# Patient Record
Sex: MALE | HISPANIC OR LATINO | URBAN - METROPOLITAN AREA
[De-identification: names, ages, dates, MRNs, and addresses within clinical notes are randomized per-mention and may not be internally consistent; named-entity substitution may affect disease eponyms.]

---

## 2023-03-24 ENCOUNTER — OFFICE VISIT (OUTPATIENT)
Dept: URGENT CARE | Facility: CLINIC | Age: 28
End: 2023-03-24

## 2023-03-24 ENCOUNTER — APPOINTMENT (OUTPATIENT)
Dept: RADIOLOGY | Facility: CLINIC | Age: 28
End: 2023-03-24

## 2023-03-24 VITALS
DIASTOLIC BLOOD PRESSURE: 59 MMHG | TEMPERATURE: 97.6 F | BODY MASS INDEX: 32.39 KG/M2 | WEIGHT: 165 LBS | SYSTOLIC BLOOD PRESSURE: 113 MMHG | RESPIRATION RATE: 18 BRPM | HEART RATE: 94 BPM | OXYGEN SATURATION: 96 % | HEIGHT: 60 IN

## 2023-03-24 DIAGNOSIS — S93.402A SPRAIN OF LEFT ANKLE, UNSPECIFIED LIGAMENT, INITIAL ENCOUNTER: Primary | ICD-10-CM

## 2023-03-24 DIAGNOSIS — M79.672 LEFT FOOT PAIN: ICD-10-CM

## 2023-03-24 RX ORDER — NORETHINDRONE ACETATE AND ETHINYL ESTRADIOL 5-7-9-7
1 KIT ORAL DAILY
COMMUNITY
Start: 2023-02-13

## 2023-03-24 NOTE — PATIENT INSTRUCTIONS
Aleve twice per day for inflammation and pain  Wear brace for support (Remove braces every 3 hours)  Ice 20 minutes 3-4 times per day for 3 days  Insulate the skin from the ice to prevent frostbite  Rest and Elevate  Follow up with orthopedic if symptoms do not improve    Remove splint/brace and go straight to ER if you experience sudden increase in pain, swelling, change in color/temperature/sensation, chest pain, shortness or breath, or if you start coughing up blood  Follow up with PCP in 3-5 days

## 2023-03-24 NOTE — PROGRESS NOTES
[facility]        NAME: Sohan Allen is a 24 y o  male  : 1995    MRN: [id number]  DATE: 2023  TIME: 8:57 AM    Assessment and Plan   Sprain of left ankle, unspecified ligament, initial encounter [S93 [id number]]  1  Sprain of left ankle, unspecified ligament, initial encounter  Ambulatory Referral to Orthopedic Surgery      2  Left foot pain  XR foot 3+ vw left        Preliminary xray read by myself  No acute osseous abnormality noted  Pending radiologist final read  Ace wrap and ortho referral provided if symptoms are not improving  Patient Instructions     Aleve twice per day for inflammation and pain  Wear brace for support (Remove braces every 3 hours)  Ice 20 minutes 3-4 times per day for 3 days  Insulate the skin from the ice to prevent frostbite  Rest and Elevate  Follow up with orthopedic if symptoms do not improve    Remove splint/brace and go straight to ER if you experience sudden increase in pain, swelling, change in color/temperature/sensation, chest pain, shortness or breath, or if you start coughing up blood  Follow up with PCP in 3-5 days  Chief Complaint     Chief Complaint   Patient presents with   • Leg Pain     Pt fell at 2AM yesterday morning  Pt states she was getting out of bed and her leg was asleep and she fell and heard a crack in her left foot, pt rates it 4/10 on pain scale and states walking makes it worse         History of Present Illness       Leg Pain   Incident onset: 2AM  The incident occurred at home  The injury mechanism was a fall and an eversion injury (heard a crack)  The pain is present in the left foot (lateral malleolus)  The pain is at a severity of 4/10  Pertinent negatives include no inability to bear weight, loss of motion, loss of sensation, muscle weakness, numbness or tingling  He has tried ice for the symptoms  Review of Systems   Review of Systems   Constitutional: Negative  HENT: Negative  Respiratory: Negative  Cardiovascular: Negative  Gastrointestinal: Negative  Genitourinary: Negative  Musculoskeletal: Negative for back pain, gait problem, joint swelling, neck pain and neck stiffness  Left ankle pain   Skin: Negative  Neurological: Negative  Negative for tingling and numbness  Current Medications       Current Outpatient Medications:   •  Tilia Fe 1-20/1-30/1-35 MG-MCG TABS, Take 1 tablet by mouth daily, Disp: , Rfl:     Current Allergies     Allergies as of 03/24/2023 - Reviewed 03/24/2023   Allergen Reaction Noted   • Prednisone Hives 02/24/2018            The following portions of the patient's history were reviewed and updated as appropriate: allergies, current medications, past family history, past medical history, past social history, past surgical history and problem list      History reviewed  No pertinent past medical history  Past Surgical History:   Procedure Laterality Date   • WISDOM TOOTH EXTRACTION Bilateral        Family History   Problem Relation Age of Onset   • Asthma Mother    • Asthma Father          Medications have been verified  Objective   /59   Pulse 94   Temp 97 6 °F (36 4 °C)   Resp 18   Ht 5' (1 524 m)   Wt 74 8 kg (165 lb)   SpO2 96%   BMI 32 22 kg/m²        Physical Exam     Physical Exam  Constitutional:       Appearance: Normal appearance  HENT:      Head: Normocephalic  Cardiovascular:      Rate and Rhythm: Normal rate and regular rhythm  Pulses: Normal pulses  Heart sounds: Normal heart sounds  Pulmonary:      Effort: Pulmonary effort is normal       Breath sounds: Normal breath sounds  Musculoskeletal:         General: No swelling, tenderness, deformity or signs of injury  Normal range of motion  Cervical back: Normal range of motion  No rigidity or tenderness  Skin:     General: Skin is warm and dry  Capillary Refill: Capillary refill takes less than 2 seconds        Findings: No bruising, erythema, lesion or rash  Neurological:      General: No focal deficit present  Mental Status: He is alert and oriented to person, place, and time  Mental status is at baseline  Psychiatric:         Mood and Affect: Mood normal          Behavior: Behavior normal          Thought Content: Thought content normal          Judgment: Judgment normal          Orthopedic injury treatment    Date/Time: 3/24/2023 8:56 AM  Performed by: Brayden Levy PA-C  Authorized by: Brayden Levy PA-C     Patient Location:  Bedside  [place] Protocol:  Risks and benefits: risks, benefits and alternatives were discussed  Consent given by: patient      Injury location:  Foot  Location details:  Left foot  Injury type:   Soft tissue  Neurovascular status: Neurovascularly intact    Distal perfusion: normal    Neurological function: normal    Range of motion: normal    Immobilization:  Ace wrap  Neurovascular status: Neurovascularly intact    Distal perfusion: normal    Neurological function: normal    Range of motion: normal    Patient tolerance:  Patient tolerated the procedure well with no immediate complications